# Patient Record
Sex: FEMALE | Race: WHITE
[De-identification: names, ages, dates, MRNs, and addresses within clinical notes are randomized per-mention and may not be internally consistent; named-entity substitution may affect disease eponyms.]

---
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
## 2019-01-09 NOTE — HP
PATIENT: TRESA MILLER                                MEDICAL RECORD: [id number]
ACCOUNT: [id number]                                    LOCATION:D.OPS         
: [birth year]                                            ADMISSION DATE: 18
                                                         PCP: MÓNICA ABURTO
 
                             HISTORY AND PHYSICAL EXAMINATION
 
 
HISTORY OF PRESENT ILLNESS:  Tresa is 18 years old.  She has had problem with
chronic rhinosinusitis, nasal obstruction, nasal polyposis.  She is being
admitted for bilateral nasal polypectomy as well as revision, anterior
ethmoidectomy, and middle meatal antrostomies.
 
PAST MEDICAL HISTORY:  Includes nasal polyps, allergic rhinitis.
 
PAST SURGICAL HISTORY:  Includes bilateral myringotomy and tubes, adenoidectomy,
tonsillectomy and nasal polypectomy and sinus surgery and G-tube.
 
CURRENT MEDICATIONS:  Mom did not have a list with her at that time.  She is
going to bring that.
 
PHYSICAL EXAMINATION:
GENERAL:  She is healthy-appearing.
FACE:  Normal, symmetric, no lesions.
EYES:  Sclerae and conjunctivae are normal.
NOSE:  Nasal polyp totally obstructing the left side, polyps on the right side,
some clear drainage, but nothing looks infected currently.
ORAL CAVITY AND OROPHARYNX:  Normal palate.  Tongue protrudes midline.
NECK:  No masses, no adenopathy.
CHEST:  Clear.
CARDIOVASCULAR:  Regular rate and rhythm, no murmur.
EXTREMITIES:  Normal.
 
IMPRESSION:  Bilateral nasal polyposis, nasal obstruction, and chronic
pansinusitis.
 
PLAN:  Bilateral endoscopic nasal polypectomy, revision anterior ethmoidectomy,
revision of middle meatal antrostomy.
 
TRANSINT:[id number] Voice Confirmation ID: [id number] DOCUMENT ID: [id number]
 
 
                                           
                                           KIERRA DELEON MD                
 
 
 
Electronically Signed by KIERRA DELEON on 19 at 1949
 
 
CC:                                                             [id number]
DICTATION DATE: 18 1537     :     18 2146      [facility] 
                                                                      18
[address] [address]

## 2019-01-09 NOTE — OP
PATIENT NAME:  ALISON MILLER                          MEDICAL RECORD: [id number]
:[birth year]                                             LOCATION:D.OPS          
                                                         ADMISSION DATE:        
SURGEON:  JOSE ALFREDO CASTELLON MD                
 
 
DATE OF OPERATION:  2018
 
PREOPERATIVE DIAGNOSES:  Chronic pansinusitis, nasal obstruction, nasal
polyposis.
 
POSTOPERATIVE DIAGNOSES:  Chronic pansinusitis, nasal obstruction, nasal
polyposis.
 
PROCEDURES:
1.  Bilateral endoscopic nasal polypectomy.
2.  Bilateral revision ethmoidectomy.
3.  Bilateral middle meatal antrostomies with removal of polypoid tissue and
thick secretions.
 
SURGEON:  Jose Alfredo Castellon MD
 
NASAL PACKING:  None.
 
BLOOD LOSS:  Less than 5 cc.
 
SPECIMENS:  Cultures from the maxillary sinus portion of nasal polyps and thick
material from both maxillary sinuses for pathology.
 
COMPLICATIONS:  None.
 
DISPOSITION:  Recovery stable.
 
DESCRIPTION OF PROCEDURE:  She is brought to the operating room and placed in
supine position, sedated and intubated by anesthesia.  The table was turned 90
degrees.  Head drapes were applied.  She was positioned for sinus surgery. 
Headlight and nasal speculum were used to inject the inferior turbinates, middle
turbinate, and nasal polyps with 1.5 cc of 1% lidocaine with 1:100,000
epinephrine on a long 18-gauge needle.
 
Then, she was prepped and draped in usual sterile fashion.  She has already been
decongested with Afrin preoperatively.  Then, after waiting for decongestion,
all the Afrin pledgets were removed from both sides of the nose and the right
side was addressed first.  She had inferior turbinate and it was relatively
normal, little polypoid changes and congestion.  She had polyps filling the
middle meatus.  Microdebrider was used to take down the polyps starting
anteriorly and inferiorly, medially and cleaning out that all the way back deep
into the ethmoid cavity, removing all this, very typical appearing boggy white
polyps.  Once that was done, the maxillary sinus ostia were exposed, polyps were
removed from around the area to expose the previously opened ostia and then
there was obviously full of material using an olive-tip suction, tried to
suction this out, but it really would not come out.  Had to use a twisting
motion with a large long curved olive tip suction to basically remove this
inspissated thick green solid but slightly malleable massive material from the
maxillary sinus, mucosa was edematous and polypoid but that pretty much cleaned
out the sinus just getting that out.  Nasopharynx was normal.
 
Then, the left side was addressed, similar findings, congested inferior
 
 
 
OPERATIVE REPORT                               [id number]    ALISON MILLER        
 
 
turbinate, middle turbinate was normal but medialized from just massive nasal
polyposis.  Microdebrider was used to just take down this starting inferiorly
working superiorly and posteriorly into the ethmoid cavity, removing a massive
amount of polypoid tissue.  Some of this was sent for pathology.  Again, the
maxillary ostia was exposed, polyps were removed from around the area and again
thick almost solid but slightly malleable material within that sinus, not able
to be broken, but a large curved olive tip suction was able to manipulate this
out of the sinus and pull it from the nose, barely get it out the nostril in one
large piece and then the sinus again was opened and there was no further debris
or material in there, just polypoid changes around the mucosal surfaces, middle
turbinate was normal.  The ethmoid cavity was clear.  The nasopharynx was clear.
 The area of the frontal ducts was cleaned out with the microdebrider, but the
ducts themselves were not manipulated, just opened up that area visually with a
30-degree scope looking upwards and looked somewhat congested, but it looked
like a patent duct.  Both maxillary sinuses were irrigated repeatedly with a 60
cc syringe and saline and a curved olive tip suction.  Both sides of the
nasopharynx were suctioned.  The area was again examined.  There was really no
bleeding.  Some mupirocin ointment was placed in the ethmoid and maxillary
sinuses bilaterally with the field clean and dry.  Eyes were examined and were
normal.
 
She was awakened, extubated, and transported to recovery in good condition.
 
No complications.
 
TRANSINT:[id number] Voice Confirmation ID: [id number] DOCUMENT ID: [id number]
                                           
                                           JOSE ALFREDO CASTELLON MD                
 
 
 
Electronically Signed by JOSE ALFREDO CASTELLON on 19 at 1949
 
 
 
 
 
 
 
 
 
 
 
 
 
 
 
CC:                                                             [id number]
DICTATION DATE: 18 1337     :     188      [facility] 
                                                                      18
[facility]                                          
[address] [place] [address]